# Patient Record
Sex: MALE | URBAN - METROPOLITAN AREA
[De-identification: names, ages, dates, MRNs, and addresses within clinical notes are randomized per-mention and may not be internally consistent; named-entity substitution may affect disease eponyms.]

---
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
## 2020-04-04 ENCOUNTER — HOSPITAL ENCOUNTER (INPATIENT)
Facility: CLINIC | Age: 20
LOS: 1 days | Discharge: HOME | End: 2020-04-05
Attending: EMERGENCY MEDICINE | Admitting: HOSPITALIST
Payer: COMMERCIAL

## 2020-04-04 DIAGNOSIS — E87.6 HYPOKALEMIA: ICD-10-CM

## 2020-04-04 DIAGNOSIS — F19.10 POLYSUBSTANCE ABUSE (H): ICD-10-CM

## 2020-04-04 DIAGNOSIS — T48.3X4A POISONING BY DEXTROMETHORPHAN, UNDETERMINED INTENT, INITIAL ENCOUNTER: ICD-10-CM

## 2020-04-04 DIAGNOSIS — T50.905A DRUG-INDUCED DELIRIUM: ICD-10-CM

## 2020-04-04 DIAGNOSIS — R41.0 DRUG-INDUCED DELIRIUM: ICD-10-CM

## 2020-04-04 PROBLEM — R41.82 AMS (ALTERED MENTAL STATUS): Status: ACTIVE | Noted: 2020-04-04

## 2020-04-04 LAB
AMPHETAMINES UR QL SCN: NEGATIVE
ANION GAP SERPL CALCULATED.3IONS-SCNC: 12 MMOL/L
APAP SERPL-MCNC: <2 MG/L (ref 10–20)
BARBITURATES UR QL: NEGATIVE
BASOPHILS # BLD AUTO: 0.1 10E9/L (ref 0–0.2)
BASOPHILS NFR BLD AUTO: 0.9 %
BENZODIAZ UR QL: NEGATIVE
BUN SERPL-MCNC: 18 MG/DL
CALCIUM SERPL-MCNC: 8.8 MG/DL
CANNABINOIDS UR QL SCN: POSITIVE
CHLORIDE SERPL-SCNC: 103 MMOL/L
CO2 SERPL-SCNC: 21 MMOL/L
COCAINE UR QL: NEGATIVE
CREAT SERPL-MCNC: 1.19 MG/DL
DIFFERENTIAL METHOD BLD: ABNORMAL
EOSINOPHIL # BLD AUTO: 0.6 10E9/L (ref 0–0.7)
EOSINOPHIL NFR BLD AUTO: 3.9 %
ERYTHROCYTE [DISTWIDTH] IN BLOOD BY AUTOMATED COUNT: 13.6 % (ref 10–15)
ETHANOL SERPL-MCNC: <0.01 G/DL
GFR SERPL CREATININE-BSD FRML MDRD: ABNORMAL ML/MIN/{1.73_M2}
GLUCOSE BLDC GLUCOMTR-MCNC: 122 MG/DL
GLUCOSE BLDC GLUCOMTR-MCNC: 68 MG/DL
GLUCOSE BLDC GLUCOMTR-MCNC: 71 MG/DL
GLUCOSE BLDC GLUCOMTR-MCNC: 82 MG/DL
GLUCOSE BLDC GLUCOMTR-MCNC: 84 MG/DL
GLUCOSE BLDC GLUCOMTR-MCNC: 88 MG/DL
GLUCOSE SERPL-MCNC: 252 MG/DL (ref 70–99)
HCT VFR BLD AUTO: 42.9 %
HGB BLD-MCNC: 14 G/DL (ref 11.7–17.7)
IMM GRANULOCYTES # BLD: 0.1 10E9/L (ref 0–0.4)
IMM GRANULOCYTES NFR BLD: 0.4 %
INTERPRETATION ECG - MUSE: NORMAL
LYMPHOCYTES # BLD AUTO: 3.9 10E9/L (ref 0.8–5.3)
LYMPHOCYTES NFR BLD AUTO: 25.2 %
MCH RBC QN AUTO: 31.7 PG (ref 26.5–33)
MCHC RBC AUTO-ENTMCNC: 32.6 G/DL (ref 31.5–36.5)
MCV RBC AUTO: 97 FL (ref 78–100)
MONOCYTES # BLD AUTO: 0.9 10E9/L (ref 0–1.3)
MONOCYTES NFR BLD AUTO: 5.6 %
MRSA DNA SPEC QL NAA+PROBE: NEGATIVE
NEUTROPHILS # BLD AUTO: 9.9 10E9/L (ref 1.6–8.3)
NEUTROPHILS NFR BLD AUTO: 64 %
NRBC # BLD AUTO: 0 10*3/UL
NRBC BLD AUTO-RTO: 0 /100
OPIATES UR QL SCN: NEGATIVE
PCP UR QL SCN: NEGATIVE
PLATELET # BLD AUTO: 245 10E9/L (ref 150–450)
POTASSIUM SERPL-SCNC: 2.5 MMOL/L
POTASSIUM SERPL-SCNC: 4.2 MMOL/L
RBC # BLD AUTO: 4.42 10E12/L
SALICYLATES SERPL-MCNC: 3 MG/DL
SODIUM SERPL-SCNC: 136 MMOL/L
SPECIMEN SOURCE: NORMAL
WBC # BLD AUTO: 15.5 10E9/L (ref 4–11)

## 2020-04-04 PROCEDURE — 20000003 ZZH R&B ICU

## 2020-04-04 PROCEDURE — 40000915 ZZH STATISTIC SITTER, EVENING HOURS

## 2020-04-04 PROCEDURE — 99285 EMERGENCY DEPT VISIT HI MDM: CPT | Mod: 25

## 2020-04-04 PROCEDURE — 99223 1ST HOSP IP/OBS HIGH 75: CPT | Mod: AI | Performed by: HOSPITALIST

## 2020-04-04 PROCEDURE — 40000916 ZZH STATISTIC SITTER, NIGHT HOURS

## 2020-04-04 PROCEDURE — 80307 DRUG TEST PRSMV CHEM ANLYZR: CPT | Performed by: EMERGENCY MEDICINE

## 2020-04-04 PROCEDURE — 80329 ANALGESICS NON-OPIOID 1 OR 2: CPT | Performed by: INTERNAL MEDICINE

## 2020-04-04 PROCEDURE — 96372 THER/PROPH/DIAG INJ SC/IM: CPT

## 2020-04-04 PROCEDURE — 80320 DRUG SCREEN QUANTALCOHOLS: CPT | Performed by: EMERGENCY MEDICINE

## 2020-04-04 PROCEDURE — 25000128 H RX IP 250 OP 636: Performed by: HOSPITALIST

## 2020-04-04 PROCEDURE — 83036 HEMOGLOBIN GLYCOSYLATED A1C: CPT | Performed by: EMERGENCY MEDICINE

## 2020-04-04 PROCEDURE — 36415 COLL VENOUS BLD VENIPUNCTURE: CPT | Performed by: INTERNAL MEDICINE

## 2020-04-04 PROCEDURE — 25000128 H RX IP 250 OP 636

## 2020-04-04 PROCEDURE — 87640 STAPH A DNA AMP PROBE: CPT | Performed by: INTERNAL MEDICINE

## 2020-04-04 PROCEDURE — 96375 TX/PRO/DX INJ NEW DRUG ADDON: CPT

## 2020-04-04 PROCEDURE — 87641 MR-STAPH DNA AMP PROBE: CPT | Performed by: INTERNAL MEDICINE

## 2020-04-04 PROCEDURE — 85025 COMPLETE CBC W/AUTO DIFF WBC: CPT | Performed by: EMERGENCY MEDICINE

## 2020-04-04 PROCEDURE — 93005 ELECTROCARDIOGRAM TRACING: CPT

## 2020-04-04 PROCEDURE — 80048 BASIC METABOLIC PNL TOTAL CA: CPT | Performed by: EMERGENCY MEDICINE

## 2020-04-04 PROCEDURE — 40000914 ZZH STATISTIC SITTER, DAY HOURS

## 2020-04-04 PROCEDURE — 25800030 ZZH RX IP 258 OP 636: Performed by: HOSPITALIST

## 2020-04-04 PROCEDURE — 96376 TX/PRO/DX INJ SAME DRUG ADON: CPT

## 2020-04-04 PROCEDURE — 96365 THER/PROPH/DIAG IV INF INIT: CPT

## 2020-04-04 PROCEDURE — 25000125 ZZHC RX 250: Performed by: EMERGENCY MEDICINE

## 2020-04-04 PROCEDURE — 25000128 H RX IP 250 OP 636: Performed by: EMERGENCY MEDICINE

## 2020-04-04 PROCEDURE — 25800025 ZZH RX 258: Performed by: HOSPITALIST

## 2020-04-04 PROCEDURE — 96366 THER/PROPH/DIAG IV INF ADDON: CPT

## 2020-04-04 PROCEDURE — 84132 ASSAY OF SERUM POTASSIUM: CPT | Performed by: INTERNAL MEDICINE

## 2020-04-04 PROCEDURE — 00000146 ZZHCL STATISTIC GLUCOSE BY METER IP

## 2020-04-04 RX ORDER — KETAMINE HYDROCHLORIDE 100 MG/ML
200 INJECTION INTRAMUSCULAR; INTRAVENOUS ONCE
Status: DISCONTINUED | OUTPATIENT
Start: 2020-04-04 | End: 2020-04-04

## 2020-04-04 RX ORDER — KETAMINE HYDROCHLORIDE 100 MG/ML
INJECTION, SOLUTION INTRAMUSCULAR; INTRAVENOUS
Status: DISCONTINUED
Start: 2020-04-04 | End: 2020-04-04 | Stop reason: HOSPADM

## 2020-04-04 RX ORDER — POTASSIUM CHLORIDE 7.45 MG/ML
10 INJECTION INTRAVENOUS
Status: DISCONTINUED | OUTPATIENT
Start: 2020-04-04 | End: 2020-04-05 | Stop reason: HOSPADM

## 2020-04-04 RX ORDER — NALOXONE HYDROCHLORIDE 0.4 MG/ML
.1-.4 INJECTION, SOLUTION INTRAMUSCULAR; INTRAVENOUS; SUBCUTANEOUS
Status: DISCONTINUED | OUTPATIENT
Start: 2020-04-04 | End: 2020-04-05 | Stop reason: HOSPADM

## 2020-04-04 RX ORDER — DEXTROSE MONOHYDRATE 25 G/50ML
25-50 INJECTION, SOLUTION INTRAVENOUS
Status: DISCONTINUED | OUTPATIENT
Start: 2020-04-04 | End: 2020-04-05 | Stop reason: HOSPADM

## 2020-04-04 RX ORDER — POTASSIUM CL/LIDO/0.9 % NACL 10MEQ/0.1L
10 INTRAVENOUS SOLUTION, PIGGYBACK (ML) INTRAVENOUS
Status: DISCONTINUED | OUTPATIENT
Start: 2020-04-04 | End: 2020-04-05 | Stop reason: HOSPADM

## 2020-04-04 RX ORDER — LORAZEPAM 2 MG/ML
1 INJECTION INTRAMUSCULAR ONCE
Status: COMPLETED | OUTPATIENT
Start: 2020-04-04 | End: 2020-04-04

## 2020-04-04 RX ORDER — SODIUM CHLORIDE 9 MG/ML
INJECTION, SOLUTION INTRAVENOUS CONTINUOUS
Status: DISCONTINUED | OUTPATIENT
Start: 2020-04-04 | End: 2020-04-04

## 2020-04-04 RX ORDER — ONDANSETRON 2 MG/ML
4 INJECTION INTRAMUSCULAR; INTRAVENOUS EVERY 6 HOURS PRN
Status: DISCONTINUED | OUTPATIENT
Start: 2020-04-04 | End: 2020-04-05 | Stop reason: HOSPADM

## 2020-04-04 RX ORDER — KETAMINE HYDROCHLORIDE 100 MG/ML
200 INJECTION INTRAMUSCULAR; INTRAVENOUS ONCE
Status: COMPLETED | OUTPATIENT
Start: 2020-04-04 | End: 2020-04-04

## 2020-04-04 RX ORDER — OLANZAPINE 10 MG/2ML
10 INJECTION, POWDER, FOR SOLUTION INTRAMUSCULAR DAILY PRN
Status: DISCONTINUED | OUTPATIENT
Start: 2020-04-04 | End: 2020-04-05 | Stop reason: HOSPADM

## 2020-04-04 RX ORDER — NICOTINE POLACRILEX 4 MG
15-30 LOZENGE BUCCAL
Status: DISCONTINUED | OUTPATIENT
Start: 2020-04-04 | End: 2020-04-05 | Stop reason: HOSPADM

## 2020-04-04 RX ORDER — LIDOCAINE 40 MG/G
CREAM TOPICAL
Status: DISCONTINUED | OUTPATIENT
Start: 2020-04-04 | End: 2020-04-05 | Stop reason: HOSPADM

## 2020-04-04 RX ORDER — POTASSIUM CHLORIDE 29.8 MG/ML
20 INJECTION INTRAVENOUS
Status: DISCONTINUED | OUTPATIENT
Start: 2020-04-04 | End: 2020-04-05 | Stop reason: HOSPADM

## 2020-04-04 RX ORDER — ONDANSETRON 4 MG/1
4 TABLET, ORALLY DISINTEGRATING ORAL EVERY 6 HOURS PRN
Status: DISCONTINUED | OUTPATIENT
Start: 2020-04-04 | End: 2020-04-05 | Stop reason: HOSPADM

## 2020-04-04 RX ORDER — LORAZEPAM 2 MG/ML
2 INJECTION INTRAMUSCULAR ONCE
Status: COMPLETED | OUTPATIENT
Start: 2020-04-04 | End: 2020-04-04

## 2020-04-04 RX ORDER — LORAZEPAM 2 MG/ML
1 INJECTION INTRAMUSCULAR
Status: DISCONTINUED | OUTPATIENT
Start: 2020-04-04 | End: 2020-04-05 | Stop reason: HOSPADM

## 2020-04-04 RX ORDER — POTASSIUM CL/LIDO/0.9 % NACL 10MEQ/0.1L
10 INTRAVENOUS SOLUTION, PIGGYBACK (ML) INTRAVENOUS
Status: DISCONTINUED | OUTPATIENT
Start: 2020-04-04 | End: 2020-04-04

## 2020-04-04 RX ORDER — LORAZEPAM 2 MG/ML
1 INJECTION INTRAMUSCULAR EVERY 4 HOURS PRN
Status: DISCONTINUED | OUTPATIENT
Start: 2020-04-04 | End: 2020-04-04

## 2020-04-04 RX ORDER — POTASSIUM CHLORIDE 1500 MG/1
20-40 TABLET, EXTENDED RELEASE ORAL
Status: DISCONTINUED | OUTPATIENT
Start: 2020-04-04 | End: 2020-04-05 | Stop reason: HOSPADM

## 2020-04-04 RX ORDER — LORAZEPAM 2 MG/ML
INJECTION INTRAMUSCULAR
Status: COMPLETED
Start: 2020-04-04 | End: 2020-04-04

## 2020-04-04 RX ORDER — POTASSIUM CHLORIDE 1.5 G/1.58G
20-40 POWDER, FOR SOLUTION ORAL
Status: DISCONTINUED | OUTPATIENT
Start: 2020-04-04 | End: 2020-04-05 | Stop reason: HOSPADM

## 2020-04-04 RX ADMIN — LORAZEPAM 1 MG: 2 INJECTION INTRAMUSCULAR; INTRAVENOUS at 05:56

## 2020-04-04 RX ADMIN — DEXTROSE MONOHYDRATE 25 ML: 25 INJECTION, SOLUTION INTRAVENOUS at 20:04

## 2020-04-04 RX ADMIN — SODIUM CHLORIDE: 9 INJECTION, SOLUTION INTRAVENOUS at 10:01

## 2020-04-04 RX ADMIN — SODIUM CHLORIDE: 9 INJECTION, SOLUTION INTRAVENOUS at 21:53

## 2020-04-04 RX ADMIN — Medication 10 MEQ: at 12:33

## 2020-04-04 RX ADMIN — OLANZAPINE 10 MG: 10 INJECTION, POWDER, FOR SOLUTION INTRAMUSCULAR at 06:08

## 2020-04-04 RX ADMIN — Medication 10 MEQ: at 14:50

## 2020-04-04 RX ADMIN — LORAZEPAM 2 MG: 2 INJECTION INTRAMUSCULAR; INTRAVENOUS at 04:10

## 2020-04-04 RX ADMIN — Medication 10 MEQ: at 13:42

## 2020-04-04 RX ADMIN — LORAZEPAM 1 MG: 2 INJECTION INTRAMUSCULAR; INTRAVENOUS at 04:38

## 2020-04-04 RX ADMIN — Medication 10 MEQ: at 11:24

## 2020-04-04 RX ADMIN — KETAMINE HYDROCHLORIDE 200 MG: 100 INJECTION INTRAMUSCULAR; INTRAVENOUS at 04:05

## 2020-04-04 RX ADMIN — Medication 10 MEQ: at 15:59

## 2020-04-04 RX ADMIN — LORAZEPAM 1 MG: 2 INJECTION INTRAMUSCULAR; INTRAVENOUS at 07:03

## 2020-04-04 RX ADMIN — LORAZEPAM 1 MG: 2 INJECTION INTRAMUSCULAR at 07:03

## 2020-04-04 RX ADMIN — Medication 10 MEQ: at 10:10

## 2020-04-04 RX ADMIN — Medication 10 MEQ: at 04:38

## 2020-04-04 ASSESSMENT — ACTIVITIES OF DAILY LIVING (ADL)
ADLS_ACUITY_SCORE: 24
ADLS_ACUITY_SCORE: 26
ADLS_ACUITY_SCORE: 28
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
## 2020-04-04 NOTE — PROGRESS NOTES
Patient seen, history reviewed.    Patient was admitted for Acute intoxication with absent and dextromethorphan.  In the ER, patient required multiple medications including ketamine and Ativan.  He was somnolent afterwards.  Noted to have significant hypokalemia.    After arriving in the ICU, he remains calm.  Vitals are stable.  Sleepy but arousable.  Maintaining his airway.  Oxygen sats are satisfactory.    Urine tox screen showed cannabinoids.    Continue monitoring in the ICU.  If agitation, prefer Ativan or Zyprexa.  Staff is trying to figure out his identity and family information.
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
## 2020-04-05 VITALS
WEIGHT: 127.5 LBS | SYSTOLIC BLOOD PRESSURE: 114 MMHG | DIASTOLIC BLOOD PRESSURE: 57 MMHG | RESPIRATION RATE: 16 BRPM | HEART RATE: 55 BPM | OXYGEN SATURATION: 94 % | TEMPERATURE: 98.2 F

## 2020-04-05 LAB
GLUCOSE BLDC GLUCOMTR-MCNC: 116 MG/DL
GLUCOSE BLDC GLUCOMTR-MCNC: 80 MG/DL
GLUCOSE BLDC GLUCOMTR-MCNC: 88 MG/DL
GLUCOSE BLDC GLUCOMTR-MCNC: 97 MG/DL
HBA1C MFR BLD: 5.1 % (ref 0–5.6)

## 2020-04-05 PROCEDURE — 00000146 ZZHCL STATISTIC GLUCOSE BY METER IP

## 2020-04-05 PROCEDURE — 25800030 ZZH RX IP 258 OP 636: Performed by: SURGERY

## 2020-04-05 PROCEDURE — 99239 HOSP IP/OBS DSCHRG MGMT >30: CPT | Performed by: INTERNAL MEDICINE

## 2020-04-05 RX ORDER — FLUOXETINE 10 MG/1
10 CAPSULE ORAL DAILY
COMMUNITY

## 2020-04-05 RX ADMIN — DEXTROSE AND SODIUM CHLORIDE: 5; 900 INJECTION, SOLUTION INTRAVENOUS at 00:02

## 2020-04-05 ASSESSMENT — ACTIVITIES OF DAILY LIVING (ADL)
ADLS_ACUITY_SCORE: 24
ADLS_ACUITY_SCORE: 24
ADLS_ACUITY_SCORE: 22
ADLS_ACUITY_SCORE: 24
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
## 2020-04-05 NOTE — PLAN OF CARE
ICU End of Shift Summary.  For vital signs and complete assessments, please see documentation flowsheets.     Pertinent assessments: Pt lethargic at the beginning of the shift waking up only to touch. Only responding with 1-2 word answers. More awake by midnight assessment waking up to voice and able to answer orientation questions. Very flat affect. Denies pain. Afebrile. Up with assist of 1 at bedside to use urinal. Tele SB/SR with heart rate in the high 40s when sleeping.  Major Shift Events: 2000 - Patient's blood sugar check at 68. NPO. Slightly lethargic, but unchanged from previous shift. Given 25 mL of D50 and BS recheck after 15 minutes at 122. Rechecked at 2330 and BS at 71. Spoke with Tele-Hub and D5 added to IVF. BS rechecks thereafter where WDL.             - Writer noted a soft heart murmur with the first assessment. Notified Tele-Hub who stated to call hospitalist to come to the bedside to assess. Dr. Kinsey at bedside (see his note).             - BPs soft throughout the shift. A couple of BP checks with MAPs < 65 when pt sleeping, but as soon as patient woken up MAPs > 70. Heart rate into the high 40s when sleeping. Updated Tele-Hub with plan to monitor patient at this time.  Plan (Upcoming Events): Continue 72 hour hold and PSC at bedside for safety. Continue to monitor mental status closely.   Discharge/Transfer Needs: TBD. Continue ICU cares at this time.    Bedside Shift Report Completed   Bedside Safety Check Completed

## 2020-04-05 NOTE — PROGRESS NOTES
Discharge instructions given, iv's  Removed. Waiting for patient's mother to bring clothing, then patient will be wheeled to entrance by staff.

## 2020-04-05 NOTE — PROGRESS NOTES
X-cover    I was asked to assess this patient for heart murmur.      Nursing staff noted a heart murmur on exam.  This had not been documented in the chart by previous providers.  I reviewed the chart as well and no documentation noted.  Nurse contacted tele hub who asked I be paged to assess.      I listened to both apex and base and initially could not hear a murmur.  I asked the staff where murmur was heard and listened in that location, just to the left of the mid-sternal border.  A very subtle systolic murmur was heard in that discrete location only.      Although pt has hx of drug use, there is no current concern for endocarditis and pt has no fevers.  IVDU hx currently unknown.      Doubt significance of murmur and would not work up further at this time.  If murmur changes or pt has fever consider TTE to eval.      I suspect this murmur was not documented previously because it is extremely difficult to hear.
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
## 2020-11-16 ENCOUNTER — HEALTH MAINTENANCE LETTER (OUTPATIENT)
Age: 20
End: 2020-11-16

## 2021-09-18 ENCOUNTER — HEALTH MAINTENANCE LETTER (OUTPATIENT)
Age: 21
End: 2021-09-18

## 2022-01-08 ENCOUNTER — HEALTH MAINTENANCE LETTER (OUTPATIENT)
Age: 22
End: 2022-01-08

## 2022-11-20 ENCOUNTER — HEALTH MAINTENANCE LETTER (OUTPATIENT)
Age: 22
End: 2022-11-20

## 2023-04-15 ENCOUNTER — HEALTH MAINTENANCE LETTER (OUTPATIENT)
Age: 23
End: 2023-04-15